# Patient Record
Sex: FEMALE | Race: BLACK OR AFRICAN AMERICAN | Employment: UNEMPLOYED | URBAN - METROPOLITAN AREA
[De-identification: names, ages, dates, MRNs, and addresses within clinical notes are randomized per-mention and may not be internally consistent; named-entity substitution may affect disease eponyms.]

---

## 2017-02-24 ENCOUNTER — HOSPITAL ENCOUNTER (EMERGENCY)
Age: 37
Discharge: HOME | End: 2017-02-24
Attending: EMERGENCY MEDICINE
Payer: MEDICAID

## 2017-02-24 ENCOUNTER — APPOINTMENT (OUTPATIENT)
Dept: CT IMAGING | Age: 37
End: 2017-02-24
Attending: EMERGENCY MEDICINE
Payer: MEDICAID

## 2017-02-24 VITALS
SYSTOLIC BLOOD PRESSURE: 129 MMHG | OXYGEN SATURATION: 100 % | DIASTOLIC BLOOD PRESSURE: 76 MMHG | TEMPERATURE: 99.1 F | RESPIRATION RATE: 17 BRPM | WEIGHT: 180 LBS | HEART RATE: 77 BPM

## 2017-02-24 DIAGNOSIS — S80.01XA CONTUSION OF RIGHT KNEE, INITIAL ENCOUNTER: ICD-10-CM

## 2017-02-24 DIAGNOSIS — S01.81XA FACIAL LACERATION, INITIAL ENCOUNTER: ICD-10-CM
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
## 2017-02-24 DIAGNOSIS — S80.02XA CONTUSION OF LEFT KNEE, INITIAL ENCOUNTER: ICD-10-CM

## 2017-02-24 PROCEDURE — 77030018836 HC SOL IRR NACL ICUM -A

## 2017-02-24 PROCEDURE — 90471 IMMUNIZATION ADMIN: CPT

## 2017-02-24 PROCEDURE — 74011250636 HC RX REV CODE- 250/636: Performed by: EMERGENCY MEDICINE

## 2017-02-24 PROCEDURE — 77030031132 HC SUT NYL COVD -A

## 2017-02-24 PROCEDURE — 99285 EMERGENCY DEPT VISIT HI MDM: CPT

## 2017-02-24 PROCEDURE — 74011250637 HC RX REV CODE- 250/637: Performed by: EMERGENCY MEDICINE

## 2017-02-24 PROCEDURE — 90715 TDAP VACCINE 7 YRS/> IM: CPT | Performed by: EMERGENCY MEDICINE

## 2017-02-24 PROCEDURE — 74011000250 HC RX REV CODE- 250: Performed by: EMERGENCY MEDICINE

## 2017-02-24 PROCEDURE — 70450 CT HEAD/BRAIN W/O DYE: CPT

## 2017-02-24 PROCEDURE — 75810000293 HC SIMP/SUPERF WND  RPR

## 2017-02-24 RX ORDER — TRAMADOL HYDROCHLORIDE 50 MG/1
50 TABLET ORAL
Qty: 8 TAB | Refills: 0 | Status: SHIPPED | OUTPATIENT
Start: 2017-02-24

## 2017-02-24 RX ORDER — BACITRACIN 500 UNIT/G
1 PACKET (EA) TOPICAL
Status: COMPLETED | OUTPATIENT
Start: 2017-02-24 | End: 2017-02-24

## 2017-02-24 RX ORDER — TRAMADOL HYDROCHLORIDE 50 MG/1
50 TABLET ORAL
Status: COMPLETED | OUTPATIENT
Start: 2017-02-24 | End: 2017-02-24

## 2017-02-24 RX ADMIN — TRAMADOL HYDROCHLORIDE 50 MG: 50 TABLET, FILM COATED ORAL at 19:17

## 2017-02-24 RX ADMIN — BACITRACIN ZINC 1 PACKET: 500 OINTMENT TOPICAL at 19:35

## 2017-02-24 RX ADMIN — TETANUS TOXOID, REDUCED DIPHTHERIA TOXOID AND ACELLULAR PERTUSSIS VACCINE, ADSORBED 0.5 ML: 5; 2.5; 8; 8; 2.5 SUSPENSION INTRAMUSCULAR at 18:18

## 2017-02-24 NOTE — ED NOTES
I performed a brief evaluation, including history and physical, of the patient here in triage and I have determined that pt will need further treatment and evaluation from the main side ER physician. I have placed initial orders to help in expediting patients care.      February 24, 2017 at 5:14 PM - Lina Oppenheim,         Visit Vitals    BP (!) 146/104 (BP 1 Location: Right arm, BP Patient Position: At rest)    Pulse 100    Temp 99.1 °F (37.3 °C)    Resp 18    Wt 81.6 kg (180 lb)    SpO2 100%
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
## 2017-02-25 NOTE — DISCHARGE INSTRUCTIONS
Cuts: Care Instructions  Your Care Instructions  A cut can happen anywhere on your body. Stitches, staples, skin adhesives, or pieces of tape called Steri-Strips are sometimes used to keep the edges of a cut together and help it heal. Steri-Strips can be used by themselves or with stitches or staples. Sometimes cuts are left open. If the cut went deep and through the skin, the doctor may have closed the cut in two layers. A deeper layer of stitches brings the deep part of the cut together. These stitches will dissolve and don't need to be removed. The upper layer closure, which could be stitches, staples, Steri-Strips, or adhesive, is what you see on the cut. A cut is often covered by a bandage. The doctor has checked you carefully, but problems can develop later. If you notice any problems or new symptoms, get medical treatment right away. Follow-up care is a key part of your treatment and safety. Be sure to make and go to all appointments, and call your doctor if you are having problems. It's also a good idea to know your test results and keep a list of the medicines you take. How can you care for yourself at home? If a cut is open or closed  · Prop up the sore area on a pillow anytime you sit or lie down during the next 3 days. Try to keep it above the level of your heart. This will help reduce swelling. · Keep the cut dry for the first 24 to 48 hours. After this, you can shower if your doctor okays it. Pat the cut dry. · Don't soak the cut, such as in a bathtub. Your doctor will tell you when it's safe to get the cut wet. · After the first 24 to 48 hours, clean the cut with soap and water 2 times a day unless your doctor gives you different instructions. ¨ Don't use hydrogen peroxide or alcohol, which can slow healing. ¨ You may cover the cut with a thin layer of petroleum jelly and a nonstick bandage.   ¨ If the doctor put a bandage over the cut, put on a new bandage after cleaning the cut or if the bandage gets wet or dirty. · Avoid any activity that could cause your cut to reopen. · Be safe with medicines. Read and follow all instructions on the label. ¨ If the doctor gave you a prescription medicine for pain, take it as prescribed. ¨ If you are not taking a prescription pain medicine, ask your doctor if you can take an over-the-counter medicine. If the cut is closed with stitches, staples, or Steri-Strips  · Follow the above instructions for open or closed cuts. · Do not remove the stitches or staples on your own. Your doctor will tell you when to come back to have the stitches or staples removed. · Leave Steri-Strips on until they fall off. If the cut is closed with a skin adhesive  · Follow the above instructions for open or closed cuts. · Leave the skin adhesive on your skin until it falls off on its own. This may take 5 to 10 days. · Do not scratch, rub, or pick at the adhesive. · Do not put the sticky part of a bandage directly on the adhesive. · Do not put any kind of ointment, cream, or lotion over the area. This can make the adhesive fall off too soon. Do not use hydrogen peroxide or alcohol, which can slow healing. When should you call for help? Call your doctor now or seek immediate medical care if:  · You have new pain, or your pain gets worse. · The skin near the cut is cold or pale or changes color. · You have tingling, weakness, or numbness near the cut. · The cut starts to bleed, and blood soaks through the bandage. Oozing small amounts of blood is normal.  · You have trouble moving the area near the cut. · You have symptoms of infection, such as:  ¨ Increased pain, swelling, warmth, or redness around the cut. ¨ Red streaks leading from the cut. ¨ Pus draining from the cut. ¨ A fever. Watch closely for changes in your health, and be sure to contact your doctor if:  · The cut reopens. · You do not get better as expected. Where can you learn more?   Go to http://luis-yesenia.info/. Enter M735 in the search box to learn more about \"Cuts: Care Instructions. \"  Current as of: May 27, 2016  Content Version: 11.1  © 9236-1192 to be. Care instructions adapted under license by [employer] (which disclaims liability or warranty for this information). If you have questions about a medical condition or this instruction, always ask your healthcare professional. [address] any warranty or liability for your use of this information. Motor Vehicle Accident: Care Instructions  Your Care Instructions  You were seen by a doctor after a motor vehicle accident. Because of the accident, you may be sore for several days. Over the next few days, you may hurt more than you did just after the accident. The doctor has checked you carefully, but problems can develop later. If you notice any problems or new symptoms, get medical treatment right away. Follow-up care is a key part of your treatment and safety. Be sure to make and go to all appointments, and call your doctor if you are having problems. It's also a good idea to know your test results and keep a list of the medicines you take. How can you care for yourself at home? · Keep track of any new symptoms or changes in your symptoms. · Take it easy for the next few days, or longer if you are not feeling well. Do not try to do too much. · Put ice or a cold pack on any sore areas for 10 to 20 minutes at a time to stop swelling. Put a thin cloth between the ice pack and your skin. Do this several times a day for the first 2 days. · Be safe with medicines. Take pain medicines exactly as directed. ¨ If the doctor gave you a prescription medicine for pain, take it as prescribed. ¨ If you are not taking a prescription pain medicine, ask your doctor if you can take an over-the-counter medicine. · Do not drive after taking a prescription pain medicine.   · Do not do anything that makes the pain worse. · Do not drink any alcohol for 24 hours or until your doctor tells you it is okay. When should you call for help? Call 911 if:  · You passed out (lost consciousness). Call your doctor now or seek immediate medical care if:  · You have new or worse belly pain. · You have new or worse trouble breathing. · You have new or worse head pain. · You have new pain, or your pain gets worse. · You have new symptoms, such as numbness or vomiting. Watch closely for changes in your health, and be sure to contact your doctor if:  · You are not getting better as expected. Where can you learn more? Go to http://luis-yesenia.info/. Enter B106 in the search box to learn more about \"Motor Vehicle Accident: Care Instructions. \"  Current as of: May 27, 2016  Content Version: 11.1  © 8656-0802 [employer] Care instructions adapted under license by [employer] (which disclaims liability or warranty for this information). If you have questions about a medical condition or this instruction, always ask your healthcare professional. [address] any warranty or liability for your use of this information. I have reviewed discharge instructions with the patient. The patient verbalized understanding.

## 2025-02-12 ENCOUNTER — OFFICE VISIT (OUTPATIENT)
Age: 45
End: 2025-02-12
Payer: COMMERCIAL

## 2025-02-12 VITALS
BODY MASS INDEX: 31.52 KG/M2 | DIASTOLIC BLOOD PRESSURE: 85 MMHG | HEIGHT: 68 IN | SYSTOLIC BLOOD PRESSURE: 133 MMHG | WEIGHT: 208 LBS | HEART RATE: 62 BPM | OXYGEN SATURATION: 98 % | TEMPERATURE: 97 F

## 2025-02-12 DIAGNOSIS — I44.30 AV BLOCK: ICD-10-CM

## 2025-02-12 DIAGNOSIS — R00.2 PALPITATIONS: Primary | ICD-10-CM

## 2025-02-12 DIAGNOSIS — R01.1 SYSTOLIC MURMUR: ICD-10-CM

## 2025-02-12 DIAGNOSIS — R07.89 OTHER CHEST PAIN: ICD-10-CM

## 2025-02-12 PROCEDURE — 99204 OFFICE O/P NEW MOD 45 MIN: CPT | Performed by: INTERNAL MEDICINE

## 2025-02-12 RX ORDER — METHOCARBAMOL 500 MG/1
500 TABLET, FILM COATED ORAL 4 TIMES DAILY PRN
COMMUNITY

## 2025-02-12 RX ORDER — IBUPROFEN 600 MG/1
600 TABLET, FILM COATED ORAL EVERY 6 HOURS PRN
COMMUNITY
Start: 2024-09-01

## 2025-02-12 ASSESSMENT — ENCOUNTER SYMPTOMS
ALLERGIC/IMMUNOLOGIC NEGATIVE: 1
EYES NEGATIVE: 1
SHORTNESS OF BREATH: 0
GASTROINTESTINAL NEGATIVE: 1

## 2025-02-12 ASSESSMENT — PATIENT HEALTH QUESTIONNAIRE - PHQ9
1. LITTLE INTEREST OR PLEASURE IN DOING THINGS: NOT AT ALL
SUM OF ALL RESPONSES TO PHQ QUESTIONS 1-9: 0
SUM OF ALL RESPONSES TO PHQ9 QUESTIONS 1 & 2: 0
SUM OF ALL RESPONSES TO PHQ QUESTIONS 1-9: 0
2. FEELING DOWN, DEPRESSED OR HOPELESS: NOT AT ALL

## 2025-02-12 NOTE — PROGRESS NOTES
1. \"Have you been to the ER, urgent care clinic since your last visit?  Hospitalized since your last visit?\" Reviewed by Dr. Olvin Fischer     2. \"Have you seen or consulted any other health care providers outside of the [facility] since your last visit?\" Reviewed by Dr. Olvin Fischer

## 2025-02-12 NOTE — PROGRESS NOTES
Miriam Barba (:  1980) is a 44 y.o. female,New patient, here for evaluation of the following chief complaint(s):  New Patient (Referred By Dr. Fischer- Atrioventricular Block )    Subjective   SUBJECTIVE/OBJECTIVE:  History of Present Illness  The patient presents for evaluation of chest palpitations.    She has been experiencing chest palpitations, particularly around the onset of her menstrual cycle, which typically occurs on the 15th of each month. These palpitations have been a persistent issue for several years but have recently intensified. She also reports shortness of breath during physical activity, which she attributes to recent weight gain and a lack of exercise. She has no history of hypertension, diabetes, or thyroid disorders. She is currently on pain medication for back issues and has previously been prescribed muscle relaxants. She has a history of anxiety and often experiences palpitations while at rest, particularly when lying down. She also reports episodes of screaming during sleep and waking up crying. She has a history of smoking marijuana but has since quit. She underwent an EKG at [facility] around [facility] and was previously given a heart monitor to wear for 72 hours, during which she did not experience any symptoms.    Supplemental Information  She has 2 children and experienced an ectopic pregnancy and a miscarriage in the past. She underwent embolization for fibroids, which resulted in significant weight loss due to a decreased appetite. However, she has since regained the weight.    SOCIAL HISTORY  She has a history of smoking marijuana but has since quit.    FAMILY HISTORY  Her mother has diabetes. Her uncle has heart issues related to long-term crack use.    I have carefully reviewed all available medical records, previous office notes, lab, x-ray and procedure reports      History reviewed. No pertinent past medical history.     History reviewed. No pertinent